# Patient Record
(demographics unavailable — no encounter records)

---

## 2018-10-24 NOTE — ER DOCUMENT REPORT
ED Medical Screen (RME)





- General


Chief Complaint: Anxiety


Stated Complaint: ANXIETY


Time Seen by Provider: 10/24/18 21:19


Mode of Arrival: Ambulatory


Information source: Patient


Notes: 





Patient is a 35-year-old female who presents to the emergency department with 

multiple complaints today.  Patient reports that she has a history of anxiety 

and panic attacks, states she takes Ativan 2 mg 4 times daily.  States she has 

been out for 2 weeks.  Patient also complains of right upper and left lower 

dental pain.  Patient reports she has seen a dentist but has not been able to 

have any work done as her insurance has not come through yet.





TRAVEL OUTSIDE OF THE [place] IN LAST 30 DAYS: No





- Related Data


Allergies/Adverse Reactions: 


 





Penicillins Allergy (Verified 10/24/18 19:19)


 











Past Medical History





- General


Information source: Patient





- Social History


Chew tobacco use (# tins/day): No


Frequency of alcohol use: None


Drug Abuse: None


Neurological Medical History: Reports: Hx Migraine


Renal/ Medical History: Denies: Hx Peritoneal Dialysis


Psychiatric Medical History: Reports: Hx Anxiety, Other - Panic disorder


Surgical Hx: Negative





- Immunizations


Immunizations up to date: Yes





Review of Systems





- Review of Systems


EENT: Dental problem


Neurological/Psychological: Anxiety


-: Yes All other systems reviewed and negative





Physical Exam





- Vital signs


Vitals: 


 











Temp Pulse Resp BP Pulse Ox


 


 98.5 F   97   16   124/76   100 


 


 10/24/18 19:32  10/24/18 19:32  10/24/18 19:32  10/24/18 19:32  10/24/18 19:32














- Notes


Notes: 





PHYSICAL EXAMINATION:





GENERAL: Well-appearing, well-nourished and in no acute distress.





HEAD: Atraumatic, normocephalic.





EYES: Pupils equal round extraocular movements intact,  conjunctiva are normal.





ENT: Nares patent, most of patient's teeth are missing, multiple areas of poor 

dentition noted, no drainable abscess identified.





NECK: Normal range of motion





LUNGS: No respiratory distress





Musculoskeletal: Normal range of motion





NEUROLOGICAL:  Normal speech, normal gait. 





PSYCH: Normal mood, normal affect.





SKIN: Warm, Dry, normal turgor, no rashes or lesions noted.





Course





- Re-evaluation


Re-evalutation: 





Patient will be placed on clindamycin for her dental issues.  Patient will be 

given a prescription for Ativan 1 mg x 6 tablets total.  I did explain to 

patient that she needs to establish a primary care provider here in [place] 

[place] and that we will not to be able to refill her Ativan again.  Patient 

verbalizes understanding of same.





- Vital Signs


Vital signs: 


 











Temp Pulse Resp BP Pulse Ox


 


 97.9 F   88   20   118/72   100 


 


 10/24/18 22:28  10/24/18 22:28  10/24/18 22:28  10/24/18 22:28  10/24/18 22:28














Doctor's Discharge





- Discharge


Clinical Impression: 


 Anxiety





Condition: Stable


Disposition: HOME, SELF-CARE


Instructions:  Anxiety ([facility])


Additional Instructions: 


TOOTHACHE:





     Your pain is due to dental decay.  The tooth must be repaired in order for 

you to feel better.  You will, therefore, be referred to a dentist.  We do not 

have dentists on the staff at [facility].


     Severe swelling or drainage around a tooth usually means a dental abscess.

  This also requires evaluation and treatment by the dentist, but antibiotics 

may be prescribed while awaiting dental treatment.


     You should be rechecked immediately if you develop major swelling of the 

face, increasing pain, a lump in the jaw or gums, headache, difficulty 

swallowing, or fever.





CLINDAMYCIN:


     You have been given a prescription for the antibiotic clindamycin.  It is 

often prescribed for infections in the mouth, such as dental infections or 

abscesses, and for skin infections due to MRSA.  It's important that you take 

all the medication, unless instructed otherwise by your physician.  Failure to 

complete the entire course can result in relapse of your condition.


     Common side effects of antibiotics include nausea, intestinal cramping, or 

diarrhea.  Women may develop vaginal yeast infections, and babies can get yeast 

(thrush) in the mouth following the use of antibiotics.  Contact your physician 

if you develop significant side effects from this medication.


     Allergy to this antibiotic can result in hives, wheezing, faintness, or 

itching.  If symptoms of allergy occur, stop the medication and call the doctor.








FOLLOW-UP CARE:


     You have been referred for follow-up care to the dentists listed below.  

Call the dentists office for an appointment as you were instructed or within 

the next two days.  If you experience worsening or a significant change in your 

symptoms, notify the physician immediately or return to the Emergency 

Department at any time for re-evaluation.





[facility] [address], [place]


[phone]








****I am refilling your anxiety medication for 3 days.  We will be unable to 

refill this medication in the emergency department again.  Please call one of 

the primary care providers that I have listed on your paperwork and set up an 

appointment to establish care.  Take the antibiotics for your dental infection.

  Follow-up with a dentist as you have already scheduled.****


Prescriptions: 


Clindamycin HCl 300 mg PO TID #30 capsule


Lorazepam [Ativan 1 mg Tablet] 1 mg PO BID PRN #6 tab


 PRN Reason: Anxiety

## 2019-01-18 NOTE — ER DOCUMENT REPORT
HPI





- HPI


Time Seen by Provider: 01/18/19 23:12


Pain Level: Denies


Notes: 





Patient is a 35-year-old female who presents to the ED complaining of Rt upper 

dental pain #23-4 days.  Pt states that she has bad teeth and is schedule with 

the oral surgeon next friday.  She is already on cleocin.  Pt states that the 

tooth is fractured and she has been using conservative measures with no relief. 

She has not noticed any obvious abscess or purulent discharge.  Patient states 

that he is still able to eat and drink, but does have a decreased p.o. intake 

due to the pain.  No other concerns or complaints.  Denies any headache, fever, 

head injury, neck pain, hoarseness, drooling, URI, sore throat, chest pain, 

palpitations, syncope, cough, shortness of breath, wheeze, dyspnea, abdominal 

pain, nausea/vomiting/diarrhea, urinary retention, dysuria, hematuria, or rash.





- ROS


Systems Reviewed and Negative: Yes All other systems reviewed and negative





- REPRODUCTIVE


Reproductive: DENIES: Pregnant:





Past Medical History





- Social History


Smoking Status: Never Smoker


Family History: Reviewed & Not Pertinent


Neurological Medical History: Reports: Hx Migraine


Renal/ Medical History: Denies: Hx Peritoneal Dialysis


Psychiatric Medical History: Reports: Hx Anxiety





- Immunizations


Immunizations up to date: Yes





Vertical Provider Document





- CONSTITUTIONAL


Agree With Documented VS: Yes


Notes: 





PHYSICAL EXAMINATION:





GENERAL: Well-appearing, well-nourished and in no acute distress.





HEAD: Atraumatic, normocephalic.





EYES: Pupils equal round and reactive to light, extraocular movements intact, 

sclera anicteric, conjunctiva are normal.





ENT: EAC clear b/l.  TM's intact b/l without erythema, fluid, or perforation.  

Nares patent and without discharge.  oropharynx clear without exudates.  No 

tonsilar hypertrophy or erythema.  Moist mucous membranes.  No sinus tenderness.

 Uvula midline.  No palatine shift.  No tongue protrusion.  No respiratory 

compromise.





Mouth:  Poor dentition.  + severe decay and mild gingivitis.  + dental fracture 

to #2.  No obvious abscess or discharge noted.  No facial swelling.  + 

tenderness to tooth #2.





NECK: Normal range of motion, supple without lymphadenopathy.  No 

rigidity/meningismus.





LUNGS: Breath sounds clear to auscultation bilaterally and equal.  No wheezes 

rales or rhonchi.





HEART: Regular rate and rhythm without murmurs, rubs, gallops.





NEUROLOGICAL: Cranial nerves grossly intact.  Normal speech, normal gait.  

Normal sensory, motor exams 





PSYCH: Normal mood, normal affect.





SKIN: Warm, Dry, normal turgor, no rashes or lesions noted.





- INFECTION CONTROL


TRAVEL OUTSIDE OF THE [place] IN LAST 30 DAYS: No





Course





- Re-evaluation


Re-evalutation: 





01/18/19 23:20


Patient is an afebrile, well-hydrated, 35-year-old female who presents to the ED

with dental pain, suspect nerve root etiology versus infection.  Vitals are 

acceptable.  PE is otherwise unremarkable.  No I&D, labs, or imaging warranted 

at this time based on H&P.  Viscous lidocaine dispensed today as well as toradol

given. Pt is already on cleocin.  Low suspicion for any meningitis, sepsis, 

peritonsillar/pharyngeal abscess, respiratory compromise, Jaspreet's, temporal 

arteritis, or other emergent systemic condition at this time.  Patient is aware 

this condition can change from initial presentation and she needs to monitor 

symptoms closely.  Conservative measures otherwise for symptoms.  Call to 

schedule an appointment with a dentist for further evaluation and management.  

Recheck with your PCM this week as well.  Return to the ED with any 

worsening/concerning symptoms otherwise as reviewed in discharge.  Patient is in

agreement.





- Vital Signs


Vital signs: 


                                        











Temp Pulse Resp BP Pulse Ox


 


 98.5 F   71   16   120/75   100 


 


 01/18/19 20:22  01/18/19 20:22  01/18/19 20:22  01/18/19 20:22  01/18/19 20:22














Discharge





- Discharge


Clinical Impression: 


 Pain, dental





Condition: Stable


Disposition: HOME, SELF-CARE


Instructions:  Toothache ([facility])


Additional Instructions: 


Brush and floss twice daily


Maintain fluid intake


Take antibiotics as directed


Mouthwash, salt water gargles, peroxide rinse as needed


Tylenol/ibuprofen as needed


Recheck with PCM this week


Keep appointment with your oral surgeon next week


Return to the ED with any worsening symptoms and/or development of fever, 

headache, facial swelling, swelling of lips/tongue/throat, trouble swallowing, 

drooling, hoarseness, neck pain/stiffness, chest pain, palpitations, syncope, 

shortness of breath, trouble breathing, abdominal pain, n/v/d, 

numbness/tingling, or other worsening symptoms that are concerning to you.


Referrals: 


[facility] [Provider Group] - Follow up as needed